# Patient Record
Sex: FEMALE | Race: ASIAN | NOT HISPANIC OR LATINO | URBAN - METROPOLITAN AREA
[De-identification: names, ages, dates, MRNs, and addresses within clinical notes are randomized per-mention and may not be internally consistent; named-entity substitution may affect disease eponyms.]

---

## 2024-02-12 ENCOUNTER — TRANSCRIBE ORDERS (OUTPATIENT)
Dept: PHYSICAL THERAPY | Facility: HOSPITAL | Age: 79
End: 2024-02-12
Payer: MEDICARE

## 2024-02-12 DIAGNOSIS — M22.2X1 PATELLOFEMORAL DISORDER OF RIGHT KNEE: Primary | ICD-10-CM

## 2024-02-29 ENCOUNTER — HOSPITAL ENCOUNTER (OUTPATIENT)
Dept: PHYSICAL THERAPY | Facility: HOSPITAL | Age: 79
Discharge: HOME | End: 2024-02-29
Admitting: STUDENT IN AN ORGANIZED HEALTH CARE EDUCATION/TRAINING PROGRAM
Payer: MEDICARE

## 2024-02-29 DIAGNOSIS — M25.561 RIGHT KNEE PAIN, UNSPECIFIED CHRONICITY: Primary | ICD-10-CM

## 2024-02-29 DIAGNOSIS — Z74.09 IMPAIRED MOBILITY: ICD-10-CM

## 2024-02-29 PROCEDURE — 97162 PT EVAL MOD COMPLEX 30 MIN: CPT | Performed by: PHYSICAL THERAPIST

## 2024-02-29 PROCEDURE — 97530 THERAPEUTIC ACTIVITIES: CPT | Performed by: PHYSICAL THERAPIST

## 2024-02-29 NOTE — THERAPY EVALUATION
Outpatient Physical Therapy Ortho Initial Evaluation  [facility]     Patient Name: Daija Wong  : 1945  MRN: [id number]  Today's Date: 2024      Visit Date: 2024    There is no problem list on file for this patient.       Past Medical History:   Diagnosis Date    Hypertension     Low sodium levels         No past surgical history on file.    Visit Dx:     ICD-10-CM ICD-9-CM   1. Right knee pain, unspecified chronicity  M25.561 719.46   2. Impaired mobility  Z74.09 799.89          Patient History       Row Name 24 1000             History    Chief Complaint Difficulty Walking;Difficulty with daily activities;Pain  -GJ      Type of Pain Knee pain  R  -GJ      Date Current Problem(s) Began --  2022  -GJ      Brief Description of Current Complaint Ms. Wong is a 77 y/o female. They present with chief c/o R knee pain, which started in  of , with swelling/fluid build up.  She reports gradual onset of knee pain with no over DELANEY. She denies groin/hip pain.  She reports sensation of knee buckling but denies that it has actually happened yet.  She does wear support brace for her R knee prn.  She denies catching/locking.  Her condition is improving. Pain location is medial R knee. Her pain is constant, particularly in stance.  Aggravating activities include standing, walking. Relieving activities include topical ointment.Denies N/T BLE. Occasional sleep disturbance. Xray at [place] recently. Previous treatments for this condition injections (x 2, most recent 6 months ago, helped with swelling, but not pain), therapy. Retired professor.  Hobbies include walking/exercise. Goal is to walk >/= 1 hour without pain.  -GJ      Previous treatment for THIS PROBLEM Injections;Rehabilitation  -GJ      Patient/Caregiver Goals Relieve pain;Improve mobility;Improve strength;Know what to do to help the symptoms  -GJ      Occupation/sports/leisure activities retired professor,  walking, exercise  -GJ      What clinical tests have you had for this problem? X-ray  -GJ      Are you or can you be pregnant No  -GJ         Pain     Pain Location Knee  R  -GJ      What Performance Factors Make the Current Problem(s) WORSE? walking, standing  -GJ      What Performance Factors Make the Current Problem(s) BETTER? topical ointment,  -GJ         Fall Risk Assessment    Any falls in the past year: No  -GJ         Daily Activities    Primary Language English  -GJ      Are you able to read Yes  -GJ      Are you able to write Yes  -GJ      How does patient learn best? Demonstration;Pictures/Video;Reading;Listening  -GJ      Teaching needs identified Home Exercise Program;Management of Condition  -GJ      Patient is concerned about/has problems with Climbing Stairs;Performing home management (household chores, shopping, care of dependents);Performing job responsibilities/community activities (work, school,;Performing sports, recreation, and play activities;Transfers (getting out of a chair, bed);Walking;Standing  -GJ      Barriers to learning None  -GJ      Pt Participated in POC and Goals Yes  -GJ                User Key  (r) = Recorded By, (t) = Taken By, (c) = Cosigned By      Initials Name Provider Type    Vasu Catherine, PT Physical Therapist                     PT Ortho       Row Name 02/29/24 1000       Posture/Observations    Alignment Options Genu valgus;Genu varus  -GJ    Genu valgus Right:;Moderate;Severe;Left:;Mild  -GJ       Special Tests/Palpation    Special Tests/Palpation Knee  -GJ       Knee Palpation    Knee Palpation? Yes  -GJ    Medial Joint Line Right:;Tender  -GJ       Patellar Accessory Motions    Patellar Accessory Motions Tested? Yes  -GJ    Superior glide Right:;Hypomobile  -GJ    Inferior glide Right:;Hypomobile  -GJ    Medial glide Right:;Hypomobile  -GJ    Lateral glide Right:;Hypomobile  -GJ       Knee Special Tests    Sonal’s sign (DVT) Bilateral:;Negative  -GJ        General ROM    RT Lower Ext Rt Knee Extension/Flexion  -GJ    LT Lower Ext Lt Knee Extension/Flexion  -GJ    GENERAL ROM COMMENTS grossly noted bilateral hip/ankle AROM grossly symetrical/WFL  -GJ       Right Lower Ext    Rt Knee Extension/Flexion AROM , seated  -GJ    Rt Knee Extension/Flexion PROM 5 supine  -GJ       Left Lower Ext    Lt Knee Extension/Flexion AROM , seated  -GJ    Lt Knee Extension/Flexion PROM 5 supine  -GJ       MMT (Manual Muscle Testing)    Rt Lower Ext Rt Hip Flexion;Rt Hip Extension;Rt Hip ABduction;Rt Knee Extension;Rt Knee Flexion;Rt Ankle Plantarflexion;Rt Ankle Dorsiflexion  -GJ    Lt Lower Ext Lt Hip Flexion;Lt Hip Extension;Lt Hip ABduction;Lt Knee Extension;Lt Knee Flexion;Lt Ankle Plantarflexion;Lt Ankle Dorsiflexion  -GJ       MMT Right Lower Ext    Rt Hip Flexion MMT, Gross Movement (4-/5) good minus  noted lateral trunk flexion, indicating decreased core strength  -GJ    Rt Hip ABduction MMT, Gross Movement (4/5) good  -GJ    Rt Knee Extension MMT, Gross Movement (4/5) good  -GJ    Rt Knee Flexion MMT, Gross Movement (4+/5) good plus  -GJ    Rt Ankle Dorsiflexion MMT, Gross Movement (5/5) normal  -GJ       MMT Left Lower Ext    Lt Hip Flexion MMT, Gross Movement (4-/5) good minus  noted lateral trunk flexion, indicating decreased core strength  -GJ    Lt Hip ABduction MMT, Gross Movement (4/5) good  -GJ    Lt Knee Extension MMT, Gross Movement (4+/5) good plus  -GJ    Lt Knee Flexion MMT, Gross Movement (4+/5) good plus  -GJ    Lt Ankle Dorsiflexion MMT, Gross Movement (5/5) normal  -GJ       Flexibility    Flexibility Tested? Lower Extremity  -GJ       Lower Extremity Flexibility    Hamstrings Bilateral:;Mildly limited;Moderately limited  -GJ    Hip Flexors Bilateral:;Mildly limited  -GJ    Hip External Rotators Bilateral:;Mildly limited  -GJ    Hip Internal Rotators Bilateral:;Mildly limited  -GJ    Gastrocnemius Bilateral:;Mildly limited  -GJ       Gait/Stairs  (Locomotion)    Comment, (Gait/Stairs) gait with  significant R genu valgus, increased lateral sway over RLE in stance, no AD  -GJ              User Key  (r) = Recorded By, (t) = Taken By, (c) = Cosigned By      Initials Name Provider Type    Vasu Catherine, PT Physical Therapist                                Therapy Education  Education Details: [id number] discussed dx, px, poc, discussed anatomy of the knee and physiology of healing, discussed realistic expectations and time frames for therapy. Discussed activity modification.  Encourage patient to consider icing her knee 1-2 times a day x 10 to 15 minutes at a time.  Educated patient in trial of ice Massage to the right medial knee discussed benefits and the use of an assistive device particularly cane and demonstrated/educated on sequencing while using cane.  Discussed activity modifications particularly within the pool and avoidance of frog kick.  Discussed walking program starting gently perhaps 15 to 20 minutes assess response and progressing by 10% rule.  Discussed footwear.  Given: HEP, Symptoms/condition management, Pain management, Posture/body mechanics, Mobility training, Edema management  Program: New  How Provided: Verbal, Demonstration, Other (comment) (texted vis [employer])  Provided to: Patient  Level of Understanding: Teach back education performed, Verbalized, Demonstrated      PT OP Goals       Row Name 02/29/24 1000          PT Short Term Goals    STG Date to Achieve 03/30/24  -     STG 1 pt. to be I with initial HEP to facilitate self management of their condition  -     STG 1 Progress New  -     STG 2 pt. to be educated in/verbalize understanding of the importance of posture/ergonomics in association with their condition to facilitate self management of their condition  -GJ     STG 2 Progress New  -     STG 3 pt. to ambulate with near normal heel to toe gait pattern with SC to facilitate ease/safety with community mobility  -      STG 3 Progress New  -GJ     STG 4 Patient report ability to walk greater than equal to 30 minutes without exacerbation of right knee pain to facilitate ease of performing fitness/community activities  -GJ     STG 4 Progress New  -GJ        Long Term Goals    LTG Date to Achieve 05/29/24  -GJ     LTG 1 pt. to be I with advanced HEP to facilitate self management of their condition  -GJ     LTG 1 Progress New  -GJ     LTG 2 pt. to report a KOS >/=  65%  to demonstrate decreased level of perceived disability  -GJ     LTG 2 Progress New  -GJ     LTG 3 pt. to ascend/descends stairs with reciprocal pattern (</= 1 rails) to facilitate ease/safety of household/community mobility  -GJ     LTG 3 Progress New  -GJ     LTG 4 pt. to ambulate without AD with near normal heel to toe gait pattern to facilitate ease/safety of community mobility  -GJ     LTG 4 Progress New  -GJ     LTG 5 pt to demonstrate R  knee AROM  extension >/= 5 (in sitting) degrees to facilitate ease/safety with gait  -GJ     LTG 5 Progress New  -GJ     LTG 6 Patient report ability to walk greater than equal to 1 hour without exacerbation of right knee pain to facilitate ease of performing fitness/community activities  -GJ     LTG 6 Progress New  -        Time Calculation    PT Goal Re-Cert Due Date 05/29/24  -               User Key  (r) = Recorded By, (t) = Taken By, (c) = Cosigned By      Initials Name Provider Type    Vasu Catherine, PT Physical Therapist                     PT Assessment/Plan       Row Name 02/29/24 8543          PT Assessment    Functional Limitations Impaired gait;Limitation in home management;Limitations in community activities;Performance in leisure activities  -     Impairments Balance;Gait;Impaired flexibility;Impaired muscle endurance;Impaired muscle length;Impaired muscle power;Impaired postural alignment;Joint mobility;Muscle strength;Pain;Poor body mechanics;Posture;Range of motion  -     Assessment Comments Ms.  Marvin is an 78-year-old female.  She reports this primary complaint of right medial knee pain which started in the fall 2022.  She reports noting swelling and fluid buildup.  Overall her condition is better than it was in fall 2022.  She reports her knee pain was of gradual onset with no overt knowledge of DELANEY.  She denies groin/hip pain.  She reports sensations of her knee buckling but denies that it has buckled causing a fall.  She does report occasionally wearing knee brace for support.  She denies catching and locking in the right knee.  She reports that her pain is constant, particularly in stance.  Aggravating activities include prolonged standing, walking.  Relieving activities include topical ointments.  She denies numbness and tingling bilateral lower extremities.  She reports having therapy for this 1 to 2 years ago, receiving 2 injections to her knee with most recent being 6 months ago.  This did help with swelling but not the pain.  She is a retired professor and enjoys walking and exercise.  Her goal is to walk greater than 1 hour without pain. Ms.. Wong presents to the clinic ambulating without assistive device, significant right continue valgus, increased lateral sway with right lower extremity in stance phase.  She demonstrates decreased active range of motion particularly in extension for the right knee.  She is tender to palpation along the right medial joint line of the knee.  She demonstrates decreased hip girdle strength bilaterally and decreased core strength. She reports a KOS score of 52%, scored 0-100, 100% represents no perceived disability.    demonstrates evolving s/s consistent with degenerative changes of her right knee which limits her participation in household/leisure/community/fitness activities.    Aggravating/Personal factors affecting recovery include,  but are not limited to, chronicity of condition and progression of valgus deformity.  Ms.  Marvin may benefit from skilled physical therapy to address the above impairments.  -GJ     Please refer to paper survey for additional self-reported information Yes  -GJ     Rehab Potential Good  -GJ     Patient/caregiver participated in establishment of treatment plan and goals Yes  -GJ     Patient would benefit from skilled therapy intervention Yes  -GJ        PT Plan    PT Frequency 1x/week;2x/week  -GJ     Predicted Duration of Therapy Intervention (PT) 10 visists  -GJ     Planned CPT's? PT EVAL MOD COMPLELITY: 07742;PT RE-EVAL: 21114;PT THER ACT EA 15 MIN: 34001;PT THER PROC EA 15 MIN: 33260;PT MANUAL THERAPY EA 15 MIN: 72560;PT NEUROMUSC RE-EDUCATION EA 15 MIN: 25415;PT GAIT TRAINING EA 15 MIN: 16254;PT HOT OR COLD PACK TREAT MCARE;PT ELECTRICAL STIM UNATTEND: ;PT ELECTRICAL STIM ATTD EA 15 MIN: 34045;PT AQUATIC THERAPY EA 15 MIN: 45041  -GJ     PT Plan Comments warm up on bike vs. nustep, assess response to initial HEP and review form. ? LAQ/SAQ, seated HS curl,  seated HS stretch  -GJ               User Key  (r) = Recorded By, (t) = Taken By, (c) = Cosigned By      Initials Name Provider Type    Vasu Catherine, PT Physical Therapist                       OP Exercises       Row Name 02/29/24 1049             Total Minutes    14120 - PT Therapeutic Activity Minutes 15  education  -GJ         Exercise 1    Exercise Name 1 nustep vs RCB  -GJ      Additional Comments next visit  -GJ         Exercise 2    Exercise Name 2 QS  -GJ      Cueing 2 Other (comment)  -GJ      Reps 2 15  -GJ      Time 2 5s  -GJ         Exercise 3    Exercise Name 3 SLR  -GJ      Cueing 3 Other (comment)  -GJ      Reps 3 15  -GJ         Exercise 4    Exercise Name 4 SL clam  -GJ      Cueing 4 Other (comment)  -GJ      Reps 4 15  -GJ      Time 4 3s  -GJ      Additional Comments RTB  -GJ         Exercise 5    Exercise Name 5 bridge  -GJ      Cueing 5 Other (comment)  -GJ      Reps 5 15  -GJ      Time 5 3s  -GJ          Exercise 6    Exercise Name 6 HR, standing  -      Cueing 6 Other (comment)  -GJ      Reps 6 15  -GJ                User Key  (r) = Recorded By, (t) = Taken By, (c) = Cosigned By      Initials Name Provider Type    Vasu Catherine, PT Physical Therapist                                  Outcome Measure Options: Knee Outcome Score- ADL  Knee Outcome Survey Activities of Daily Living Scale  Symptoms: Pain: I have the symptom, but it does not affect my activity  Symptoms: Stiffness: The symptom affects my activity slightly  Symptoms: Swelling: The symptom affects my activity moderately  Symptoms: Giving way, buckling, or shifting of the knee: I have the symptom, but it does not affect my activity  Symptoms: Weakness: I do not have the symptom  Symptoms: Limping: I do not have the symptom  Functional Limitations with ADL's: Walk: Activity is very difficult  Functional Limitations with ADL's: Go up stairs: Activity is fairly difficult  Functional Limitations with ADL's: Go down stairs: Activity is very difficult  Functional Limitations with ADL's: Stand: Activity is somewhat difficult  Functional Limitations with ADL's: Kneel on front of your knee: I am unable to  Functional Limitations with ADL's: Squat: Activity is fairly difficult  Functional Limitations with ADL's: Sit with your knee bent: Activity is fairly difficult  Functional Limitations with ADL's: Rise from a chair: Activity is somewhat difficult  Knee Outcome Summary ADL's Score: 52.86 %      Time Calculation:     Start Time: 1049  Stop Time: 1135  Time Calculation (min): 46 min  Timed Charges  09113 - PT Therapeutic Activity Minutes: 15 (education)  Total Minutes  Timed Charges Total Minutes: 15   Total Minutes: 15     Therapy Charges for Today       Code Description Service Date Service Provider Modifiers Qty    91387931753  PT THERAPEUTIC ACT EA 15 MIN 2/29/2024 Vasu Beverly, PT GP 1    01302850927 HC PT EVAL MOD COMPLEXITY 2 2/29/2024 Rush  Vasu KIRK, PT GP 1            PT G-Codes  Outcome Measure Options: Knee Outcome Score- ADL         Vasu Beverly, PT  2/29/2024

## 2024-03-07 ENCOUNTER — HOSPITAL ENCOUNTER (OUTPATIENT)
Dept: PHYSICAL THERAPY | Facility: HOSPITAL | Age: 79
Setting detail: THERAPIES SERIES
Discharge: HOME | End: 2024-03-07
Payer: MEDICARE

## 2024-03-07 DIAGNOSIS — M25.561 RIGHT KNEE PAIN, UNSPECIFIED CHRONICITY: Primary | ICD-10-CM

## 2024-03-07 DIAGNOSIS — Z74.09 IMPAIRED MOBILITY: ICD-10-CM

## 2024-03-07 PROCEDURE — 97530 THERAPEUTIC ACTIVITIES: CPT | Performed by: PHYSICAL THERAPIST

## 2024-03-07 NOTE — THERAPY TREATMENT NOTE
Outpatient Physical Therapy Ortho Treatment Note  [facility]     Patient Name: Daija Wong  : 1945  MRN: [id number]  Today's Date: 3/7/2024      Visit Date: 2024    Visit Dx:    ICD-10-CM ICD-9-CM   1. Right knee pain, unspecified chronicity  M25.561 719.46   2. Impaired mobility  Z74.09 799.89       There is no problem list on file for this patient.       Past Medical History:   Diagnosis Date    Hypertension     Low sodium levels         No past surgical history on file.                     PT Assessment/Plan       Row Name 24 1127          PT Assessment    Assessment Comments Ms. Wong returns to clinic today reporting good adherence to home exercise program.  She is ambulating without an assistive device and significant genu valgus of the right knee.  We reviewed current program with intermittent cueing and progressed lower extremity strengthening.  Updated HEP accordingly.  Strengthening to be once every other day with range of motion and stretching being daily.  Reviewed the concept of allowing tissue recovery between strengthening sessions with patient, and this will likely require reinforcement.  Discussed activity modifications and pacing activities.  Reiterated the idea that more is not better particular the station her rehab program.  Reiterated that we will likely not change the angulation of her leg.  The goal with therapy is to make her stronger to hopefully decrease stress to the tissue improved function mobility and decrease pain.  Will likely need to review this idea.  Ms Wong remains motivated candidate for skilled physical therapy and is progressing towards all functional goals at this time.  -GJ        PT Plan    PT Plan Comments assess response to new exercises, consider step up maybe to 4 inch, ? step down from foam?  Discussed use of assistive ice to decrease stress to tissue.  -               User Key  (r) = Recorded By, (t) = Taken By, (c)  = Cosigned By      Initials Name Provider Type    GJ Vasu Beverly, PT Physical Therapist                     Modalities       Row Name 03/07/24 1100             Ice    Ice Applied Yes  -GJ      Location R knee  -GJ      PT Ice Rx Minutes 15  -GJ      Ice S/P Rx Yes  -GJ                User Key  (r) = Recorded By, (t) = Taken By, (c) = Cosigned By      Initials Name Provider Type    GJ Vasu Beverly, PT Physical Therapist                   OP Exercises       Row Name 03/07/24 1055 03/07/24 1000          Total Minutes    57271 - PT Therapeutic Activity Minutes 40  -GJ --        Exercise 1    Exercise Name 1 -- Nustep  -GJ     Time 1 -- 5min  -GJ     Additional Comments -- L4, UE/Cynthia cues to avoid genu valgus collapse  -GJ        Exercise 4    Exercise Name 4 -- SL clam  -GJ     Cueing 4 -- Verbal;Demo  -GJ     Sets 4 -- 2  -GJ     Reps 4 -- 10  -GJ     Time 4 -- 3s RTB  -GJ        Exercise 5    Exercise Name 5 -- bridge  -GJ     Cueing 5 -- Verbal;Demo  -GJ     Sets 5 -- 2  -GJ     Reps 5 -- 10  -GJ     Time 5 -- 3s  -GJ     Additional Comments -- RTB at kness  -GJ        Exercise 6    Exercise Name 6 -- HR, standing  -GJ     Cueing 6 -- Verbal;Demo  -GJ     Reps 6 -- 20  -GJ     Time 6 -- from step  -GJ     Additional Comments -- slow eccentric phae, 5 s  -GJ        Exercise 7    Exercise Name 7 -- LAQ/SAQ  -GJ     Cueing 7 -- Verbal;Demo  -GJ     Sets 7 -- 2 each  -GJ     Reps 7 -- 10  -GJ     Time 7 -- 2#  -GJ     Additional Comments -- control eccentric phase  -GJ        Exercise 8    Exercise Name 8 -- seated HS curl  -GJ     Cueing 8 -- Verbal;Demo  -GJ     Reps 8 -- 20  -GJ     Time 8 -- RTB  -GJ        Exercise 9    Exercise Name 9 -- seated HS stretch  -GJ        Exercise 10    Exercise Name 10 -- STS  -GJ     Cueing 10 -- Verbal;Demo  -GJ     Sets 10 -- 2  -GJ     Reps 10 -- 10  -GJ     Time 10 -- RTB at knees  -GJ     Additional Comments -- standard chair  -GJ               User Key  (r) = Recorded By,  (t) = Taken By, (c) = Cosigned By      Initials Name Provider Type    Vasu Catherine W, PT Physical Therapist                                  PT OP Goals       Row Name 03/07/24 1000          PT Short Term Goals    STG Date to Achieve 03/30/24  -GJ     STG 1 pt. to be I with initial HEP to facilitate self management of their condition  -GJ     STG 1 Progress Ongoing  -GJ     STG 1 Progress Comments Reviewed with intermittent cueing, progressed,  -GJ     STG 2 pt. to be educated in/verbalize understanding of the importance of posture/ergonomics in association with their condition to facilitate self management of their condition  -GJ     STG 2 Progress Ongoing  -GJ     STG 2 Progress Comments Reviewed  -GJ     STG 3 pt. to ambulate with near normal heel to toe gait pattern with SC to facilitate ease/safety with community mobility  -GJ     STG 3 Progress Ongoing  -GJ     STG 4 Patient report ability to walk greater than equal to 30 minutes without exacerbation of right knee pain to facilitate ease of performing fitness/community activities  -GJ     STG 4 Progress Ongoing  -GJ        Long Term Goals    LTG Date to Achieve 05/29/24  -GJ     LTG 1 pt. to be I with advanced HEP to facilitate self management of their condition  -GJ     LTG 1 Progress Ongoing  -GJ     LTG 2 pt. to report a KOS >/=  65%  to demonstrate decreased level of perceived disability  -GJ     LTG 2 Progress Ongoing  -GJ     LTG 3 pt. to ascend/descends stairs with reciprocal pattern (</= 1 rails) to facilitate ease/safety of household/community mobility  -GJ     LTG 3 Progress Ongoing  -GJ     LTG 4 pt. to ambulate without AD with near normal heel to toe gait pattern to facilitate ease/safety of community mobility  -GJ     LTG 4 Progress Ongoing  -GJ     LTG 5 pt to demonstrate R  knee AROM  extension >/= 5 (in sitting) degrees to facilitate ease/safety with gait  -GJ     LTG 5 Progress Ongoing  -GJ     LTG 6 Patient report ability to walk greater  than equal to 1 hour without exacerbation of right knee pain to facilitate ease of performing fitness/community activities  -JUDIT     LTG 6 Progress Ongoing  -               User Key  (r) = Recorded By, (t) = Taken By, (c) = Cosigned By      Initials Name Provider Type    Vasu Catherine, PT Physical Therapist                    Therapy Education  Education Details: [id number], reviewed activity modification and pacing of activities.  Updated HEP, strengthening to be once every other day, stretches daily. Reviewed realistic time frames/outcomes with strengthening/therapy.  Reiterated the point that more is not better at this point in time.  Discussed 10% rule for progression of activities such as walking once she is determining that her tissues capable tolerating current activity level.  Encouraged icing of her knee 10 to 15 minutes at a time 1-2 times a day.  Answered questions.  Given: HEP, Symptoms/condition management, Pain management, Posture/body mechanics, Fall prevention and home safety, Mobility training  Program: Reinforced, New, Progressed  How Provided: Verbal, Demonstration, Written  Provided to: Patient  Level of Understanding: Teach back education performed, Verbalized, Demonstrated              Time Calculation:   Start Time: 1049  Stop Time: 1145  Time Calculation (min): 56 min  Timed Charges  14377 - PT Therapeutic Activity Minutes: 40  Untimed Charges  PT Ice Rx Minutes: 15  Total Minutes  Timed Charges Total Minutes: 40  Untimed Charges Total Minutes: 15   Total Minutes: 15  Therapy Charges for Today       Code Description Service Date Service Provider Modifiers Qty    60610711890 HC PT THERAPEUTIC ACT EA 15 MIN 3/7/2024 Vasu Beverly, PT GP 3    28536804061 HC PT HOT OR COLD PACK TREAT MCARE 3/7/2024 Vasu Beverly, PT GP 1                      Vasu Beverly, PT  3/7/2024

## 2024-03-12 ENCOUNTER — TRANSCRIBE ORDERS (OUTPATIENT)
Dept: PHYSICAL THERAPY | Facility: HOSPITAL | Age: 79
End: 2024-03-12
Payer: MEDICARE

## 2024-03-12 DIAGNOSIS — M72.2 PLANTAR FASCIITIS: Primary | ICD-10-CM

## 2024-03-19 ENCOUNTER — HOSPITAL ENCOUNTER (OUTPATIENT)
Dept: PHYSICAL THERAPY | Facility: HOSPITAL | Age: 79
Setting detail: THERAPIES SERIES
Discharge: HOME | End: 2024-03-19
Payer: MEDICARE

## 2024-03-19 DIAGNOSIS — Z74.09 IMPAIRED MOBILITY: ICD-10-CM

## 2024-03-19 DIAGNOSIS — M25.561 RIGHT KNEE PAIN, UNSPECIFIED CHRONICITY: Primary | ICD-10-CM

## 2024-03-19 PROCEDURE — 97530 THERAPEUTIC ACTIVITIES: CPT | Performed by: PHYSICAL THERAPIST

## 2024-03-19 NOTE — THERAPY TREATMENT NOTE
Outpatient Physical Therapy Ortho Treatment Note  [facility]     Patient Name: Daija Wong  : 1945  MRN: [id number]  Today's Date: 3/19/2024      Visit Date: 2024    Visit Dx:    ICD-10-CM ICD-9-CM   1. Right knee pain, unspecified chronicity  M25.561 719.46   2. Impaired mobility  Z74.09 799.89       There is no problem list on file for this patient.       Past Medical History:   Diagnosis Date    Hypertension     Low sodium levels         No past surgical history on file.                     PT Assessment/Plan       Row Name 24 1434          PT Assessment    Assessment Comments Ms. Wong returns for R knee pain, reporting exacerbation of bilateral plantar fascitis, likely after starting to wear orthotics again. We reviewed anatomy of the foot/ankle and physiolgy of healing. DIsucssed activity modifications with plantar fascitis (wear shoe at all times, perform AP's after prolonged rest, modified HR from off step to performing on level ground).  Added gastroc/soleus stretch at wall, progressed to a 4 inch step up.  SHe reports she is using a cane at home, (did not bring it today). Educated on how to adjust cane for more approprate fit.  All strengthening to be once every other day, strethes daily. HEP updated and issued. Ms. Wong continues to be a good candidate for skilled physical therapy  -        PT Plan    PT Plan Comments asess response to interventions to address plantar fascitis.  Consider step down from foam pad.  ? lateral stepping.  -               User Key  (r) = Recorded By, (t) = Taken By, (c) = Cosigned By      Initials Name Provider Type    Vasu Catherine, PT Physical Therapist                     Modalities       Row Name 24 1400             Ice    Ice Applied Yes  -GJ      Location bilateral ankle per pt request  -      PT Ice Rx Minutes 15  -GJ      Ice S/P Rx Yes  -GJ                User Key  (r) = Recorded By, (t) = Taken By,  (c) = Cosigned By      Initials Name Provider Type    Vasu Catherine, PT Physical Therapist                   OP Exercises       Row Name 03/19/24 1403 03/19/24 1400          Subjective    Subjective Comments -- I started with plantar fascitis pain the friday before I saw you last, and it got worse after I saw you. I did start with orthotics after trying an old orthotic i had.  -GJ        Total Minutes    41746 - PT Therapeutic Activity Minutes 40  -GJ --        Exercise 1    Exercise Name 1 -- Nustep  -GJ     Time 1 -- 5min  -GJ        Exercise 6    Exercise Name 6 -- HR/TR, standing  -GJ     Cueing 6 -- Verbal;Demo  -GJ     Reps 6 -- 20  -GJ     Time 6 -- level ground today  -GJ     Additional Comments -- cues to be methodical  -GJ        Exercise 7    Exercise Name 7 -- LAQ,B  -GJ     Cueing 7 -- Verbal;Demo  -GJ     Sets 7 -- 2  -GJ     Reps 7 -- 10  -GJ     Time 7 -- 4#  -GJ     Additional Comments -- contol eccetric phase  -GJ        Exercise 8    Exercise Name 8 -- seated HS curl  -GJ     Cueing 8 -- Verbal;Demo  -GJ     Reps 8 -- 20  -GJ     Time 8 -- RTB  -GJ        Exercise 10    Exercise Name 10 -- STS  -GJ     Cueing 10 -- Verbal;Demo  -GJ     Sets 10 -- 2  -GJ     Reps 10 -- 10  -GJ     Time 10 -- RTB at knees  -GJ     Additional Comments -- standard chair  -GJ        Exercise 11    Exercise Name 11 -- gastroc/soleus stretch at wall  -GJ     Cueing 11 -- Verbal;Demo  -GJ     Reps 11 -- 3, each  -GJ     Time 11 -- 20s  -GJ        Exercise 12    Exercise Name 12 -- instructed in plantar tissue massage (seated, with great toe passive extension)  -GJ     Time 12 -- 4 min  -GJ        Exercise 13    Exercise Name 13 -- step up,  -GJ     Cueing 13 -- Verbal;Demo  -GJ     Sets 13 -- 10, B  -GJ     Reps 13 -- 0-1 hand support, 4 inch step  -GJ     Time 13 -- with LE   -GJ               User Key  (r) = Recorded By, (t) = Taken By, (c) = Cosigned By      Initials Name Provider Type    Vasu Catherine,  PT Physical Therapist                                  PT OP Goals       Row Name 03/19/24 1400          PT Short Term Goals    STG Date to Achieve 03/30/24  -GJ     STG 1 pt. to be I with initial HEP to facilitate self management of their condition  -GJ     STG 1 Progress Ongoing  -GJ     STG 2 pt. to be educated in/verbalize understanding of the importance of posture/ergonomics in association with their condition to facilitate self management of their condition  -GJ     STG 2 Progress Ongoing  -GJ     STG 3 pt. to ambulate with near normal heel to toe gait pattern with SC to facilitate ease/safety with community mobility  -GJ     STG 3 Progress Ongoing  -GJ     STG 4 Patient report ability to walk greater than equal to 30 minutes without exacerbation of right knee pain to facilitate ease of performing fitness/community activities  -GJ     STG 4 Progress Ongoing  -GJ        Long Term Goals    LTG Date to Achieve 05/29/24  -GJ     LTG 1 pt. to be I with advanced HEP to facilitate self management of their condition  -GJ     LTG 1 Progress Ongoing  -GJ     LTG 2 pt. to report a KOS >/=  65%  to demonstrate decreased level of perceived disability  -GJ     LTG 2 Progress Ongoing  -GJ     LTG 3 pt. to ascend/descends stairs with reciprocal pattern (</= 1 rails) to facilitate ease/safety of household/community mobility  -GJ     LTG 3 Progress Ongoing  -GJ     LTG 4 pt. to ambulate without AD with near normal heel to toe gait pattern to facilitate ease/safety of community mobility  -GJ     LTG 4 Progress Ongoing  -GJ     LTG 5 pt to demonstrate R  knee AROM  extension >/= 5 (in sitting) degrees to facilitate ease/safety with gait  -GJ     LTG 5 Progress Ongoing  -GJ     LTG 6 Patient report ability to walk greater than equal to 1 hour without exacerbation of right knee pain to facilitate ease of performing fitness/community activities  -     LTG 6 Progress Ongoing  -               User Key  (r) = Recorded By, (t) =  "Taken By, (c) = Cosigned By      Initials Name Provider Type     Vasu Beverly, PT Physical Therapist                    Therapy Education  Education Details: [id number] reviewed activity modifications, discussed pathology and physiology of healing with plantar fascitis. Instructed in use of ice/heat, performing AP's after prolonged periods of inactivity to help \"awaken\" plantar tissues.  NEocuraged pt to wear shoes at all times.  Given: HEP, Symptoms/condition management, Pain management, Posture/body mechanics, Fall prevention and home safety, Mobility training  Program: Reinforced, New, Progressed, Modified  How Provided: Verbal, Demonstration, Written  Provided to: Patient  Level of Understanding: Teach back education performed, Verbalized, Demonstrated              Time Calculation:   Start Time: 1403  Stop Time: 1505  Time Calculation (min): 62 min  Timed Charges  15430 - PT Therapeutic Activity Minutes: 40  Untimed Charges  PT Ice Rx Minutes: 15  Total Minutes  Timed Charges Total Minutes: 40  Untimed Charges Total Minutes: 15   Total Minutes: 40  Therapy Charges for Today       Code Description Service Date Service Provider Modifiers Qty    67008911482  PT THERAPEUTIC ACT EA 15 MIN 3/19/2024 Vasu Beverly, PT GP 3    [id number]  PT HOT OR COLD PACK TREAT [facility] 3/19/2024 Vasu Beverly, PT GP 1                      Vasu Beverly, PT  3/19/2024     "

## 2024-04-03 ENCOUNTER — HOSPITAL ENCOUNTER (OUTPATIENT)
Dept: PHYSICAL THERAPY | Facility: HOSPITAL | Age: 79
Setting detail: THERAPIES SERIES
Discharge: HOME | End: 2024-04-03
Payer: MEDICARE

## 2024-04-03 DIAGNOSIS — M25.561 RIGHT KNEE PAIN, UNSPECIFIED CHRONICITY: Primary | ICD-10-CM

## 2024-04-03 DIAGNOSIS — Z74.09 IMPAIRED MOBILITY: ICD-10-CM

## 2024-04-03 PROCEDURE — 97530 THERAPEUTIC ACTIVITIES: CPT | Performed by: PHYSICAL THERAPIST

## 2024-04-03 NOTE — THERAPY PROGRESS REPORT/RE-CERT
Outpatient Physical Therapy Ortho Progress Note  [facility]     Patient Name: Daija Wong  : 1945  MRN: [id number]  Today's Date: 4/3/2024      Visit Date: 2024    Visit Dx:    ICD-10-CM ICD-9-CM   1. Right knee pain, unspecified chronicity  M25.561 719.46   2. Impaired mobility  Z74.09 799.89       There is no problem list on file for this patient.       Past Medical History:   Diagnosis Date    Hypertension     Low sodium levels         No past surgical history on file.     PT Ortho       Row Name 24 1000       MMT Right Lower Ext    Rt Hip Flexion MMT, Gross Movement (4-/5) good minus  noted lateral trunk flexion, indicating decreased core strength  -GJ    Rt Hip ABduction MMT, Gross Movement (4-/5) good minus  painful  -GJ    Rt Knee Extension MMT, Gross Movement (4+/5) good plus  assessed in available range to isometric testing  -GJ    Rt Knee Flexion MMT, Gross Movement (4+/5) good plus  -GJ       MMT Left Lower Ext    Lt Hip Flexion MMT, Gross Movement (4-/5) good minus  noted lateral trunk flexion, indicating decreased core strength  -GJ    Lt Hip ABduction MMT, Gross Movement (4/5) good  -GJ    Lt Knee Extension MMT, Gross Movement (4+/5) good plus  -GJ    Lt Knee Flexion MMT, Gross Movement (4+/5) good plus  -GJ      Row Name 24 0900       Posture/Observations    Genu valgus Right:;Moderate;Severe  -GJ       Right Lower Ext    Rt Knee Extension/Flexion AROM  sittingn  -GJ    Rt Knee Extension/Flexion PROM 5 (short of full extension) supine  -GJ              User Key  (r) = Recorded By, (t) = Taken By, (c) = Cosigned By      Initials Name Provider Type    Vasu Catherine, PT Physical Therapist                                 PT Assessment/Plan       Row Name 24 1006          PT Assessment    Functional Limitations Impaired gait;Decreased safety during functional activities;Limitations in community activities;Limitation in home management;Performance in  sport activities;Performance in leisure activities  -     Impairments Balance;Endurance;Gait;Impaired flexibility;Impaired muscle endurance;Impaired muscle length;Impaired muscle power;Impaired postural alignment;Joint mobility;Muscle strength;Pain;Poor body mechanics;Posture;Range of motion  -GJ     Assessment Comments Ms. Mccarthy is an 79 yo female. She has attended 4 sessions of physical therapy for her R knee pain. She reports exacerbation of bilateral plantar fascitis after initation of strengthening exercises, necessitating holding on formal PT for 1 session to allow for appropriate tissue response.  She returns today reporting significant improvement in her bilateral plantar fasciitis pain.. She continues to report weaknes of R knee, particularly with weight bearing/functional activities. Progress towards goals is fair to good given number of sessions atteneded, having met 2 of 4 STG's and 0 of 6 LTG's. See ortho for updated ROM of her R knee, no change from initial evaluation. She reports ambulating with cane in the community but presents to the clinic ambulating without AD, modeate to severe R genu valgus.  See orth for updated objective testing. We added leg press today, reviewed pacing of activities and realistic time frames/outcomes with therapy.  Strengthening to be only once every other day, ROM/stretches daily. FREDDY Mccarthy continues to be a candidate for skilled physical therpay.  -GJ     Please refer to paper survey for additional self-reported information No  -GJ     Rehab Potential Good  -GJ     Patient/caregiver participated in establishment of treatment plan and goals Yes  -GJ     Patient would benefit from skilled therapy intervention Yes  -GJ        PT Plan    PT Frequency 1x/week;2x/week  -GJ     Predicted Duration of Therapy Intervention (PT) 10 visits  -GJ     Planned CPT's? PT RE-EVAL: 78245;PT THER PROC EA 15 MIN: 37329;PT THER ACT EA 15 MIN: 63944;PT MANUAL THERAPY EA 15 MIN:  23368;PT NEUROMUSC RE-EDUCATION EA 15 MIN: 77952;PT GAIT TRAINING EA 15 MIN: 40856;PT HOT OR COLD PACK TREAT [facility];PT AQUATIC THERAPY EA 15 MIN: 96943  -GJ     PT Plan Comments asses respone to leg press, continue to work on hip girdle/LE strengthening, return to step ups, increase reps, May consider lateral stepping  -GJ               User Key  (r) = Recorded By, (t) = Taken By, (c) = Cosigned By      Initials Name Provider Type     Vasu Beverly, PT Physical Therapist                       OP Exercises       Row Name 04/03/24 0957 04/03/24 0900          Subjective    Subjective Comments -- My plantar fascitis is much better, my knee feels weak when I'm walking  -GJ        Total Minutes    79441 - PT Therapeutic Activity Minutes 40  -GJ --        Exercise 1    Exercise Name 1 -- Nustep  -GJ     Time 1 -- 5min  -GJ     Additional Comments -- cues to avoid excessive valgus collapse  -GJ        Exercise 4    Exercise Name 4 -- SL clam  -GJ     Cueing 4 -- Verbal;Demo  -GJ     Sets 4 -- 1  -GJ     Reps 4 -- 10  -GJ     Time 4 -- 3s RTB  -GJ     Additional Comments -- B  -GJ        Exercise 5    Exercise Name 5 -- bridge  -GJ     Cueing 5 -- Verbal;Demo  -GJ     Sets 5 -- 2  -GJ     Reps 5 -- 10  -GJ     Time 5 -- 3s  -GJ     Additional Comments -- RTB  -GJ        Exercise 6    Exercise Name 6 -- HR/TR, standing  -GJ     Cueing 6 -- Verbal;Demo  -GJ     Reps 6 -- 20  -GJ     Time 6 -- level ground today  -GJ     Additional Comments -- cues to be methodical  -GJ        Exercise 7    Exercise Name 7 -- LAQ,B  -GJ     Cueing 7 -- Verbal;Demo  -GJ     Sets 7 -- 2  -GJ     Reps 7 -- 10  -GJ     Time 7 -- 2#  -GJ     Additional Comments -- pt requested to pull back on weight secondary to pain during previous session  -GJ        Exercise 8    Exercise Name 8 -- seated HS curl  -GJ     Cueing 8 -- Verbal;Demo  -GJ     Sets 8 -- 2  -GJ     Reps 8 -- 10  -GJ     Time 8 -- RTB, B  -GJ        Exercise 10    Exercise Name 10 --  STS  -GJ     Cueing 10 -- Verbal;Demo  -GJ     Reps 10 -- 10  -GJ     Time 10 -- RTB at knees  -GJ     Additional Comments -- standard chair, cues to control eccentric phase  -GJ        Exercise 14    Exercise Name 14 -- leg press, BLE  -GJ     Cueing 14 -- Demo  -GJ     Sets 14 -- 2  -GJ     Reps 14 -- 10  -GJ     Time 14 -- 60#  -GJ     Additional Comments -- avoid valgus collapse  -GJ               User Key  (r) = Recorded By, (t) = Taken By, (c) = Cosigned By      Initials Name Provider Type    Vasu Catherine, PT Physical Therapist                                  PT OP Goals       Row Name 04/03/24 0900          PT Short Term Goals    STG Date to Achieve 04/30/24  -GJ     STG 1 pt. to be I with initial HEP to facilitate self management of their condition  -GJ     STG 1 Progress Met  -GJ     STG 2 pt. to be educated in/verbalize understanding of the importance of posture/ergonomics in association with their condition to facilitate self management of their condition  -GJ     STG 2 Progress Met  -GJ     STG 3 pt. to ambulate with near normal heel to toe gait pattern with SC to facilitate ease/safety with community mobility  -GJ     STG 3 Progress Ongoing  -GJ     STG 3 Progress Comments ambulating today in clinic without cane (reports uses in comunity), altered gait pattern  -GJ     STG 4 Patient report ability to walk greater than equal to 30 minutes without exacerbation of right knee pain to facilitate ease of performing fitness/community activities  -GJ     STG 4 Progress Ongoing  -GJ     STG 4 Progress Comments pt reports weakness/fatigue in knee, not pain  -GJ        Long Term Goals    LTG Date to Achieve 05/29/24  -GJ     LTG 1 pt. to be I with advanced HEP to facilitate self management of their condition  -GJ     LTG 1 Progress Ongoing  -GJ     LTG 2 pt. to report a KOS >/=  65%  to demonstrate decreased level of perceived disability  -GJ     LTG 2 Progress Ongoing  -GJ     LTG 3 pt. to ascend/descends  stairs with reciprocal pattern (</= 1 rails) to facilitate ease/safety of household/community mobility  -GJ     LTG 3 Progress Ongoing  -GJ     LTG 4 pt. to ambulate without AD with near normal heel to toe gait pattern to facilitate ease/safety of community mobility  -GJ     LTG 4 Progress Ongoing  -GJ     LTG 4 Progress Comments ambulating today in clinic without cane (reports uses in comunity), altered gait pattern  -GJ     LTG 5 pt to demonstrate R  knee AROM  extension >/= 5 (in sitting) degrees to facilitate ease/safety with gait  -GJ     LTG 5 Progress Ongoing  -GJ     LTG 5 Progress Comments see ortho  -GJ     LTG 6 Patient report ability to walk greater than equal to 1 hour without exacerbation of right knee pain to facilitate ease of performing fitness/community activities  -GJ     LTG 6 Progress Ongoing  -GJ     LTG 6 Progress Comments pt reports weakness/fatigue in knee, not pain  -GJ               User Key  (r) = Recorded By, (t) = Taken By, (c) = Cosigned By      Initials Name Provider Type     Vasu Beverly, PT Physical Therapist                    Therapy Education  Education Details: reviewed activity modifications, strengthening to be once every other day, stretches daily,  Given: HEP, Symptoms/condition management, Pain management, Posture/body mechanics, Fall prevention and home safety, Mobility training  Program: Reinforced  How Provided: Verbal, Demonstration  Provided to: Patient  Level of Understanding: Teach back education performed, Verbalized, Demonstrated              Time Calculation:   Start Time: 1000  Stop Time: 1045  Time Calculation (min): 45 min  Timed Charges  64294 - PT Therapeutic Activity Minutes: 40  Total Minutes  Timed Charges Total Minutes: 40   Total Minutes: 40  Therapy Charges for Today       Code Description Service Date Service Provider Modifiers Qty    [id number]  PT THERAPEUTIC ACT EA 15 MIN 4/3/2024 Vasu Beverly, PT GP 3                      Vasu KIRK.  Rush, PT  4/3/2024

## 2024-04-17 ENCOUNTER — HOSPITAL ENCOUNTER (OUTPATIENT)
Dept: PHYSICAL THERAPY | Facility: HOSPITAL | Age: 79
Setting detail: THERAPIES SERIES
Discharge: HOME | End: 2024-04-17
Payer: MEDICARE

## 2024-04-17 DIAGNOSIS — M25.561 RIGHT KNEE PAIN, UNSPECIFIED CHRONICITY: Primary | ICD-10-CM

## 2024-04-17 DIAGNOSIS — Z74.09 IMPAIRED MOBILITY: ICD-10-CM

## 2024-04-17 PROCEDURE — 97530 THERAPEUTIC ACTIVITIES: CPT | Performed by: PHYSICAL THERAPIST

## 2024-04-17 NOTE — THERAPY TREATMENT NOTE
Outpatient Physical Therapy Ortho Treatment Note  [facility]     Patient Name: Daija Wong  : 1945  MRN: [id number]  Today's Date: 2024      Visit Date: 2024    Visit Dx:    ICD-10-CM ICD-9-CM   1. Right knee pain, unspecified chronicity  M25.561 719.46   2. Impaired mobility  Z74.09 799.89       There is no problem list on file for this patient.       Past Medical History:   Diagnosis Date    Hypertension     Low sodium levels         No past surgical history on file.                     PT Assessment/Plan       Row Name 24 1300          PT Assessment    Assessment Comments Ms. Wong returns to the clinic reporting mild improvement in her condition however continues to report decreased balance and general feelings of decreased strength.  Spent extra time today discussing realistic timeframes and outcomes with therapy given her advanced genu valgus deformity.  Discussed appropriate timeframes for allowing for healing and strengthening to occur.  Answered questions.  Went ahead and establish additional visits that once every 2 weeks.  Reviewed and updated HEP (see exercise section).  Strengthening exercises to be once every other day, range of motion to be daily. Ms. Wong continue good candidate for skilled physical therapy.  -GJ        PT Plan    PT Plan Comments Assess response to updated HEP, may consider standing TKE at wall with ball may consider the need for long to allow for weight acceptance and quad hip girdle strengthening.  Cautioned not to overdo it secondary propensity of onset of plantar fasciitis/other muscle soreness  -JUDIT               User Key  (r) = Recorded By, (t) = Taken By, (c) = Cosigned By      Initials Name Provider Type    Vasu Catherine, PT Physical Therapist                       OP Exercises       Row Name 24 1058 24 1000          Subjective    Subjective Comments -- what can we do to strengthen my legs. I don't have   alot of pain in my (R) knee, but i don't feel strong  -GJ        Total Minutes    80698 - PT Therapeutic Activity Minutes 40  -GJ --        Exercise 1    Exercise Name 1 -- Nustep  -GJ     Time 1 -- 5min  -GJ     Additional Comments -- cues to avoid excessive valgus collapse  -GJ        Exercise 4    Exercise Name 4 -- SL clam  -GJ     Cueing 4 -- Verbal;Demo  -GJ     Sets 4 -- 1  -GJ     Reps 4 -- 10  -GJ     Time 4 -- 3s RTB  -GJ     Additional Comments -- B  -GJ        Exercise 6    Exercise Name 6 -- HR/TR, standing  -GJ     Cueing 6 -- Verbal;Demo  -GJ     Reps 6 -- 20  -GJ     Time 6 -- blue pad  -GJ     Additional Comments -- gentle finger tip suppor, cues to avoid momentum  -GJ        Exercise 9    Exercise Name 9 -- lateral stepping  -GJ     Cueing 9 -- Verbal;Demo  -GJ     Reps 9 -- 3 laps  -GJ     Time 9 -- RTB at knees  -GJ        Exercise 10    Exercise Name 10 -- STS  -GJ     Cueing 10 -- Verbal;Demo  -GJ     Sets 10 -- 1  -GJ     Reps 10 -- 10  -GJ     Time 10 -- RTB at knees  -GJ     Additional Comments -- standard chair, cues to control eccentric phase  -GJ        Exercise 13    Exercise Name 13 -- step up,  -GJ     Cueing 13 -- Verbal;Demo  -GJ     Sets 13 -- 2 x 10, B  -GJ     Reps 13 -- 0-1 hand  -GJ     Time 13 -- 6 inch  -GJ     Additional Comments -- LE   -GJ        Exercise 14    Exercise Name 14 -- leg press, BLE  -GJ     Cueing 14 -- Demo  -GJ     Sets 14 -- 2  -GJ     Reps 14 -- 15  -GJ     Time 14 -- 70#  -GJ               User Key  (r) = Recorded By, (t) = Taken By, (c) = Cosigned By      Initials Name Provider Type    GJ Vasu Beverly, PT Physical Therapist                                  PT OP Goals       Row Name 04/17/24 1000          PT Short Term Goals    STG Date to Achieve 04/30/24  -GJ     STG 1 pt. to be I with initial HEP to facilitate self management of their condition  -GJ     STG 1 Progress Met  -GJ     STG 2 pt. to be educated in/verbalize understanding of the  importance of posture/ergonomics in association with their condition to facilitate self management of their condition  -     STG 2 Progress Met  -     STG 3 pt. to ambulate with near normal heel to toe gait pattern with SC to facilitate ease/safety with community mobility  -     STG 3 Progress Met  -     STG 3 Progress Comments Likely patient baseline ambulation status, and a cane, significant genu valgus deformity with noted increase in valgus moment at initial contact on the right  -     STG 4 Patient report ability to walk greater than equal to 30 minutes without exacerbation of right knee pain to facilitate ease of performing fitness/community activities  -     STG 4 Progress Ongoing  -        Long Term Goals    LTG Date to Achieve 05/29/24  -GJ     LTG 1 pt. to be I with advanced HEP to facilitate self management of their condition  -GJ     LTG 1 Progress Ongoing  -     LTG 2 pt. to report a KOS >/=  65%  to demonstrate decreased level of perceived disability  -     LTG 2 Progress Ongoing  -     LTG 3 pt. to ascend/descends stairs with reciprocal pattern (</= 1 rails) to facilitate ease/safety of household/community mobility  -     LTG 3 Progress Ongoing  -     LTG 4 pt. to ambulate without AD with near normal heel to toe gait pattern to facilitate ease/safety of community mobility  -     LTG 4 Progress Ongoing  -     LTG 5 pt to demonstrate R  knee AROM  extension >/= 5 (in sitting) degrees to facilitate ease/safety with gait  -     LTG 5 Progress Ongoing  -     LTG 6 Patient report ability to walk greater than equal to 1 hour without exacerbation of right knee pain to facilitate ease of performing fitness/community activities  -     LTG 6 Progress Ongoing  [facility]               User Key  (r) = Recorded By, (t) = Taken By, (c) = Cosigned By      Initials Name Provider Type    Vasu Catherine, PT Physical Therapist                    Therapy Education  Education Details:  [id number] reviewed activity modifications, updated HEP.  Strengthening to continue to be only once every other day.  Reviewed physiology of healing and realistic time frames and outcomes with therapy, particularly given the significant nature of the genu valgus deformity on the right knee.  Answered questions.  Given: HEP, Symptoms/condition management, Pain management, Posture/body mechanics, Fall prevention and home safety, Mobility training  Program: Reinforced, Progressed, New, Modified  How Provided: Verbal, Demonstration, Written  Provided to: Patient  Level of Understanding: Teach back education performed, Verbalized, Demonstrated              Time Calculation:   Start Time: 1048  Stop Time: 1133  Time Calculation (min): 45 min  Timed Charges  50833 - PT Therapeutic Activity Minutes: 40  Total Minutes  Timed Charges Total Minutes: 40   Total Minutes: 40  Therapy Charges for Today       Code Description Service Date Service Provider Modifiers Qty    [id number] HC PT THERAPEUTIC ACT EA 15 MIN 4/17/2024 Vasu Beverly, PT GP 3                      Vasu Beverly, PT  4/17/2024

## 2025-08-18 ENCOUNTER — APPOINTMENT (OUTPATIENT)
Dept: GENERAL RADIOLOGY | Facility: HOSPITAL | Age: 80
End: 2025-08-18
Payer: MEDICARE

## 2025-08-18 ENCOUNTER — HOSPITAL ENCOUNTER (EMERGENCY)
Facility: HOSPITAL | Age: 80
Discharge: HOME | End: 2025-08-18
Attending: EMERGENCY MEDICINE | Admitting: EMERGENCY MEDICINE
Payer: MEDICARE

## 2025-08-18 VITALS
BODY MASS INDEX: 20.73 KG/M2 | HEART RATE: 76 BPM | WEIGHT: 117 LBS | RESPIRATION RATE: 16 BRPM | OXYGEN SATURATION: 100 % | TEMPERATURE: 98.7 F | SYSTOLIC BLOOD PRESSURE: 176 MMHG | HEIGHT: 63 IN | DIASTOLIC BLOOD PRESSURE: 81 MMHG

## 2025-08-18 DIAGNOSIS — E87.1 HYPONATREMIA: Primary | ICD-10-CM

## 2025-08-18 LAB
ALBUMIN SERPL-MCNC: 4.2 G/DL (ref 3.5–5.2)
ALBUMIN/GLOB SERPL: 1.5 G/DL
ALP SERPL-CCNC: 53 U/L (ref 39–117)
ALT SERPL W P-5'-P-CCNC: 13 U/L (ref 1–33)
ANION GAP SERPL CALCULATED.3IONS-SCNC: 8.8 MMOL/L (ref 5–15)
AST SERPL-CCNC: 23 U/L (ref 1–32)
BACTERIA UR QL AUTO: NORMAL /HPF
BASOPHILS # BLD AUTO: 0.03 10*3/MM3 (ref 0–0.2)
BASOPHILS NFR BLD AUTO: 0.5 % (ref 0–1.5)
BILIRUB SERPL-MCNC: 0.3 MG/DL (ref 0–1.2)
BILIRUB UR QL STRIP: NEGATIVE
BUN SERPL-MCNC: 20.7 MG/DL (ref 8–23)
BUN/CREAT SERPL: 31.4 (ref 7–25)
CALCIUM SPEC-SCNC: 9.5 MG/DL (ref 8.6–10.5)
CHLORIDE SERPL-SCNC: 92 MMOL/L (ref 98–107)
CLARITY UR: CLEAR
CO2 SERPL-SCNC: 29.2 MMOL/L (ref 22–29)
COLOR UR: YELLOW
CREAT SERPL-MCNC: 0.66 MG/DL (ref 0.57–1)
DEPRECATED RDW RBC AUTO: 46.3 FL (ref 37–54)
EGFRCR SERPLBLD CKD-EPI 2021: 88.3 ML/MIN/1.73
EOSINOPHIL # BLD AUTO: 0.07 10*3/MM3 (ref 0–0.4)
EOSINOPHIL NFR BLD AUTO: 1.2 % (ref 0.3–6.2)
ERYTHROCYTE [DISTWIDTH] IN BLOOD BY AUTOMATED COUNT: 13.7 % (ref 12.3–15.4)
GLOBULIN UR ELPH-MCNC: 2.8 GM/DL
GLUCOSE SERPL-MCNC: 99 MG/DL (ref 65–99)
GLUCOSE UR STRIP-MCNC: NEGATIVE MG/DL
HCT VFR BLD AUTO: 36.2 % (ref 34–46.6)
HGB BLD-MCNC: 12 G/DL (ref 12–15.9)
HGB UR QL STRIP.AUTO: ABNORMAL
HOLD SPECIMEN: NORMAL
HYALINE CASTS UR QL AUTO: NORMAL /LPF
IMM GRANULOCYTES # BLD AUTO: 0.01 10*3/MM3 (ref 0–0.05)
IMM GRANULOCYTES NFR BLD AUTO: 0.2 % (ref 0–0.5)
KETONES UR QL STRIP: NEGATIVE
LEUKOCYTE ESTERASE UR QL STRIP.AUTO: NEGATIVE
LYMPHOCYTES # BLD AUTO: 1.32 10*3/MM3 (ref 0.7–3.1)
LYMPHOCYTES NFR BLD AUTO: 23.5 % (ref 19.6–45.3)
MAGNESIUM SERPL-MCNC: 1.9 MG/DL (ref 1.6–2.4)
MCH RBC QN AUTO: 29.9 PG (ref 26.6–33)
MCHC RBC AUTO-ENTMCNC: 33.1 G/DL (ref 31.5–35.7)
MCV RBC AUTO: 90.3 FL (ref 79–97)
MONOCYTES # BLD AUTO: 0.54 10*3/MM3 (ref 0.1–0.9)
MONOCYTES NFR BLD AUTO: 9.6 % (ref 5–12)
NEUTROPHILS NFR BLD AUTO: 3.65 10*3/MM3 (ref 1.7–7)
NEUTROPHILS NFR BLD AUTO: 65 % (ref 42.7–76)
NITRITE UR QL STRIP: NEGATIVE
PH UR STRIP.AUTO: 8 [PH] (ref 5–8)
PLATELET # BLD AUTO: 261 10*3/MM3 (ref 140–450)
PMV BLD AUTO: 9.6 FL (ref 6–12)
POTASSIUM SERPL-SCNC: 4.5 MMOL/L (ref 3.5–5.2)
PROT SERPL-MCNC: 7 G/DL (ref 6–8.5)
PROT UR QL STRIP: NEGATIVE
QT INTERVAL: 380 MS
QTC INTERVAL: 432 MS
RBC # BLD AUTO: 4.01 10*6/MM3 (ref 3.77–5.28)
RBC # UR STRIP: NORMAL /HPF
REF LAB TEST METHOD: NORMAL
SODIUM SERPL-SCNC: 130 MMOL/L (ref 136–145)
SP GR UR STRIP: 1.01 (ref 1–1.03)
SQUAMOUS #/AREA URNS HPF: NORMAL /HPF
TROPONIN T SERPL HS-MCNC: 12 NG/L
UROBILINOGEN UR QL STRIP: ABNORMAL
WBC # UR STRIP: NORMAL /HPF
WBC NRBC COR # BLD AUTO: 5.62 10*3/MM3 (ref 3.4–10.8)
WHOLE BLOOD HOLD COAG: NORMAL
WHOLE BLOOD HOLD SPECIMEN: NORMAL

## 2025-08-18 PROCEDURE — 80053 COMPREHEN METABOLIC PANEL: CPT | Performed by: EMERGENCY MEDICINE

## 2025-08-18 PROCEDURE — 81001 URINALYSIS AUTO W/SCOPE: CPT | Performed by: EMERGENCY MEDICINE

## 2025-08-18 PROCEDURE — 71045 X-RAY EXAM CHEST 1 VIEW: CPT

## 2025-08-18 PROCEDURE — 83735 ASSAY OF MAGNESIUM: CPT | Performed by: EMERGENCY MEDICINE

## 2025-08-18 PROCEDURE — 84484 ASSAY OF TROPONIN QUANT: CPT | Performed by: EMERGENCY MEDICINE

## 2025-08-18 PROCEDURE — 99284 EMERGENCY DEPT VISIT MOD MDM: CPT | Performed by: EMERGENCY MEDICINE

## 2025-08-18 PROCEDURE — 93005 ELECTROCARDIOGRAM TRACING: CPT | Performed by: EMERGENCY MEDICINE

## 2025-08-18 PROCEDURE — 85025 COMPLETE CBC W/AUTO DIFF WBC: CPT

## 2025-08-18 RX ORDER — SODIUM CHLORIDE 0.9 % (FLUSH) 0.9 %
10 SYRINGE (ML) INJECTION AS NEEDED
Status: DISCONTINUED | OUTPATIENT
Start: 2025-08-18 | End: 2025-08-18 | Stop reason: HOSPADM